# Patient Record
Sex: MALE | Race: ASIAN | NOT HISPANIC OR LATINO | Employment: FULL TIME
[De-identification: names, ages, dates, MRNs, and addresses within clinical notes are randomized per-mention and may not be internally consistent; named-entity substitution may affect disease eponyms.]

---
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
## 2023-12-10 ENCOUNTER — HOSPITAL ENCOUNTER (EMERGENCY)
Facility: HOSPITAL | Age: 23
Discharge: HOME | End: 2023-12-10
Admitting: PHYSICIAN ASSISTANT
Payer: COMMERCIAL

## 2023-12-10 VITALS
HEIGHT: 65 IN | SYSTOLIC BLOOD PRESSURE: 129 MMHG | TEMPERATURE: 98 F | OXYGEN SATURATION: 98 % | HEART RATE: 69 BPM | WEIGHT: 175 LBS | RESPIRATION RATE: 18 BRPM | DIASTOLIC BLOOD PRESSURE: 67 MMHG | BODY MASS INDEX: 29.16 KG/M2

## 2023-12-10 DIAGNOSIS — R11.2 NAUSEA AND VOMITING: ICD-10-CM

## 2023-12-10 DIAGNOSIS — H11.33 SUBCONJUNCTIVAL HEMORRHAGE, BILATERAL: ICD-10-CM

## 2023-12-10 PROCEDURE — 99282 EMERGENCY DEPT VISIT SF MDM: CPT

## 2023-12-10 NOTE — Clinical Note
Olvin Macias was seen and treated in our emergency department on 12/10/2023.  He may return to work on 12/14/2023.       If you have any questions or concerns, please don't hesitate to call.      Annika Williamson PA-C

## 2023-12-10 NOTE — ED PROVIDER NOTES
EMERGENCY DEPARTMENT ENCOUNTER      NAME: Olvin Macias  AGE: 23 year old male  YOB: 2000  MRN: [id number]  EVALUATION DATE & TIME: No admission date for patient encounter.    PCP: No primary care provider on file.    ED PROVIDER: Annika Williamson PA-C      Chief Complaint   Patient presents with    Eye Problem         FINAL IMPRESSION:  1. Subconjunctival hemorrhage, bilateral    2. Nausea and vomiting          ED COURSE & MEDICAL DECISION MAKING:    10:38 AM I introduced myself to patient, performed initial HPI and examination.       23 year old male with no pertinent PMH presents to the Emergency Department for evaluation of bilateral eye redness after vomiting and coughing last night. Reports spicy and acidic food last night, subsequently vomited x 1. Shortly after noticed redness/bleeding in both eyes. Reports mild irritation but otherwise no pain. No vision changes. Does not wear contacts or glasses. No other abnormal bleeding. Not on anticoagulation. Abdominal discomfort/nausea has resolved.    VSS. Clinically patient has bilateral subconjunctival hemorrhage. PERRL, EOM intact. Eyes otherwise unremarkable. Abdomen benign. Nothing to suggest foreign body or ocular trauma. No indication for fluorescein/slit/wood's lamp. No concern for abnormal bleeding.     Discussed diagnosis with patient and partner. Instructed on at home supportive management, follow up with PCP as needed and red flags/indications to return to the emergency department. All questions were answered to the best of my ability and patient is agreeable with plan.         Medical Decision Making    History:  Supplemental history from: Documented in chart, if applicable and Family Member/Significant Other  External Record(s) reviewed: Documented in chart, if applicable.    Work Up:  Chart documentation includes differential considered and any EKGs or imaging independently interpreted by provider.  In additional to work up documented, I  considered the following work up: Documented in chart, if applicable.    External consultation:  Discussion of management with another provider: Documented in chart, if applicable    Complicating factors:  Care impacted by chronic illness: N/A  Care affected by social determinants of health: N/A    Disposition considerations: Discharge. No recommendations on prescription strength medication(s). See documentation for any additional details.      MEDICATIONS GIVEN IN THE EMERGENCY:  Medications - No data to display    NEW PRESCRIPTIONS STARTED AT TODAY'S ER VISIT  Discharge Medication List as of 12/10/2023 11:19 AM             =================================================================    HPI    Patient information was obtained from: Patient    Use of : N/A        Olvin Macias is a 23 year old male with no pertinent PMH who presents to this ED for evaluation of bilateral eye redness/bleeding.    Patient reports eating large amount of spicy food and juice last night, high acidity and subsequently vomited x 1 with associated cough. No alcohol. Since then has noticed blood in bilateral eyes. No vision changes. Does report some discomfort, but otherwise no pain.     Not on blood thinning medicine. No nosebleeds, abnormal bruising/rashing, hematuria or blood in stools.     Stomach feels improved today, no persistent nausea. No abdominal pain. Does not take acid reducers.           REVIEW OF SYSTEMS   ROS negative unless otherwise stated in HPI     PAST MEDICAL HISTORY:  No past medical history on file.    PAST SURGICAL HISTORY:  No past surgical history on file.    CURRENT MEDICATIONS:    cetirizine-pseudoephedrine (ZYRTEC-D) 5-120 mg per tablet        ALLERGIES:  No Known Allergies    FAMILY HISTORY:  No family history on file.    SOCIAL HISTORY:   Social History     Socioeconomic History    Marital status: Single       VITALS:  /67   Pulse 69   Temp 98  F (36.7  C) (Oral)   Resp 18   Ht 1.651 m  "(5' 5\")   Wt 79.4 kg (175 lb)   SpO2 98%   BMI 29.12 kg/m      PHYSICAL EXAM    Constitutional: Well developed, Well nourished, NAD, GCS 15   HENT: Normocephalic, Atraumatic, Oropharynx clear.   Neck- Supple, Nontender. Normal ROM.   Eyes: Subconjunctival hemorrhage to bilateral eyes, more along lateral and inferior aspect of the sclera. No Chemosis. PERRL, EOM intact. No photophobia. Eyelids WNL.   Respiratory: No respiratory distress, speaking in full sentences. Normal breath sounds  Cardiovascular: Normal heart rate, Regular rhythm, No murmurs  GI: Soft, nontender  Musculoskeletal: No deformities, Moves all extremities equally.   Integument: Warm, Dry, No erythema, ecchymosis, or rash.  Neurologic: Alert & oriented x 3, Normal sensory function. No focal deficits.   Psychiatric: Affect normal, Judgment normal, Mood normal. Cooperative.      LAB:  All pertinent labs reviewed and interpreted.       RADIOLOGY:  Reviewed all pertinent imaging. Please see official radiology report.  No orders to display       EKG:    None    PROCEDURES:   None      Annika Williamson PA-C  Emergency Medicine  [facility] EMERGENCY DEPARTMENT  [facility] [facility] [id number] [phone]             Annika Williamson PA-C  12/10/23 5379    "

## 2023-12-10 NOTE — ED TRIAGE NOTES
"Pt ambulatory to triage c/o bleeding in both eyes after vomiting and coughing forcefully last night after eating and drinking \"too much\". Pt appears to have subconjunctival hemorrhage to both eyes.  Denies vision changes.         "

## 2023-12-10 NOTE — Clinical Note
Olvin Macias was seen and treated in our emergency department on 12/10/2023.  He may return to work on 12/13/2023.       If you have any questions or concerns, please don't hesitate to call.      Mabel Dorantes PA-C

## 2023-12-10 NOTE — Clinical Note
Olvin Macias was seen and treated in our emergency department on 12/10/2023.  He may return to work on 12/14/2023.       If you have any questions or concerns, please don't hesitate to call.      Mabel Dorantes PA-C

## 2023-12-10 NOTE — DISCHARGE INSTRUCTIONS
You can use tums or maalox as needed for stomach upset.  Eat a bland diet for the next few days.    You can use tylenol as needed for pain.    Follow up in clinic as needed for recheck.    Return to the emergency department if you develop fevers, eye pain, vision losses/changes, or any abnormal bleeding as we discussed. We would be happy to see you.

## 2023-12-11 ENCOUNTER — HOSPITAL ENCOUNTER (EMERGENCY)
Facility: HOSPITAL | Age: 23
Discharge: HOME | End: 2023-12-11
Payer: COMMERCIAL

## 2023-12-11 VITALS
SYSTOLIC BLOOD PRESSURE: 135 MMHG | BODY MASS INDEX: 29.16 KG/M2 | RESPIRATION RATE: 16 BRPM | TEMPERATURE: 97.9 F | HEART RATE: 60 BPM | HEIGHT: 65 IN | DIASTOLIC BLOOD PRESSURE: 77 MMHG | WEIGHT: 175 LBS | OXYGEN SATURATION: 100 %

## 2023-12-11 DIAGNOSIS — H11.33 SUBCONJUNCTIVAL HEMORRHAGE, BILATERAL: ICD-10-CM

## 2023-12-11 PROCEDURE — 99283 EMERGENCY DEPT VISIT LOW MDM: CPT

## 2023-12-11 ASSESSMENT — ENCOUNTER SYMPTOMS
DIARRHEA: 0
FEVER: 0
PHOTOPHOBIA: 0
EYE PAIN: 0
VOMITING: 0
CHILLS: 0
RHINORRHEA: 0
EYE DISCHARGE: 0
LIGHT-HEADEDNESS: 0
SINUS PAIN: 0
NAUSEA: 0
ABDOMINAL PAIN: 0
DIZZINESS: 0
EYE ITCHING: 0
HEADACHES: 0
SHORTNESS OF BREATH: 0

## 2023-12-11 ASSESSMENT — ACTIVITIES OF DAILY LIVING (ADL): ADLS_ACUITY_SCORE: 33

## 2023-12-11 ASSESSMENT — VISUAL ACUITY: OU: 1

## 2023-12-11 NOTE — ED PROVIDER NOTES
EMERGENCY DEPARTMENT ENCOUNTER      NAME: Olvin Macias  AGE: 23 year old male  YOB: 2000  MRN: [id number]  EVALUATION DATE & TIME: No admission date for patient encounter.    PCP: System, Provider Not In    ED PROVIDER: Niharika Hernandez PA-C      Chief Complaint   Patient presents with    Letter for School/Work       FINAL IMPRESSION:  1. Subconjunctival hemorrhage, bilateral      ED COURSE & MEDICAL DECISION MAKING:    Pertinent Labs & Imaging studies reviewed. (See chart for details)  23 year old male presents to the Emergency Department for evaluation of work note.  Yesterday patient was evaluated for nausea and vomiting and subconjunctival viral hemorrhage bilaterally.  At that time patient had vomited once and shortly after noticed redness/bleeding in both of his eyes. Patient was discharged home with bilateral conjunctival hemorrhage.  Patient presents today with no symptoms.  Patient reports that he needs a work note. Patient denies any nausea, vomiting, dizziness, lightheadedness, vision changes, eye pain, worsening eye redness/bleeding, neck pain, chest pain, shortness of breath, fevers, chills. Vital signs were unremarkable.  Afebrile.  On exam, cardiac and pulmonary exams unremarkable.  No facial bone tenderness.  Extraocular movements are intact.  Pupils are equal round and reactive.  Subconjunctival hemorrhage to bilateral eyes along the lateral and inferior aspect of the sclera.  No photophobia.  No swelling of the eyelids.  Abdomen is soft and nontender. No signs of infection at this time.     Reports that his symptoms have completely resolved.  Patient was provided a work note for the remainder of the week.  Patient was referred to [facility] for follow-up.  Patient will follow-up with his primary care doctor to discuss his ED visit.  Patient will return to the ED if new symptoms develop or symptoms worsen.  Strict return precautions were provided to the patient.  Patient agrees with  plan.  All questions answered.      ED COURSE:   4:28 PM  I saw the patient.  We discharged home.  Patient agrees with plan.  All questions answered.       At the conclusion of the encounter I discussed the results of all of the tests and the disposition. The questions were answered. The patient or family acknowledged understanding and was agreeable with the care plan.     0 minutes of critical care time       Additional Documentation    History:  Supplemental history from: Documented in chart, if applicable  External Record(s) reviewed: Documented in chart, if applicable.    Work Up:  Chart documentation includes differential considered and any EKGs or imaging interpreted by provider.  In additional to work up documented, I considered the following work up: Documented in chart, if applicable.    External consultation:  Discussion of management with another provider: Documented in chart, if applicable    Complicating factors:  Care impacted by chronic illness: N/A  Care affected by social determinants of health: N/A    Disposition considerations: Discharge. No recommendations on prescription strength medication(s). See documentation for any additional details.      MEDICATIONS GIVEN IN THE EMERGENCY:  Medications - No data to display    NEW PRESCRIPTIONS STARTED AT TODAY'S ER VISIT  New Prescriptions    No medications on file     =================================================================    HPI    Patient information was obtained from: patient    Use of : N/A       Olvin Macias is a 23 year old male who presents to this ED for evaluation of work note.    Per chart review, patient was seen in the emergency room yesterday for bilateral subconjunctival hemorrhage.  Patient reports that he had 1 episode of vomiting and nausea and then noticed blood in bilateral eyes.  No vision changes.  He is not on any blood thinning medications.  Patient was discharged home in a stable condition.  Patient returns to  "the ED today for a work note.  Patient reports that he continues to not have any vision changes.  No pain with movement of the eyes.  No nausea or vomiting.  No fevers or chills.  Patient reports that his symptoms have completely resolved and is only presenting to the ED for a work note.    He denies chest pain, shortness of breath, cough, congestion, runny nose, vision changes, photophobia, eye pain, headache, dizziness, lightheadedness, abdominal pain, nausea, vomiting, diarrhea, dizziness, lightheadedness, headache and all other symptoms.      REVIEW OF SYSTEMS   Review of Systems   Constitutional:  Negative for chills and fever.   HENT:  Negative for congestion, rhinorrhea and sinus pain.    Eyes:  Negative for photophobia, pain, discharge, itching and visual disturbance.   Respiratory:  Negative for shortness of breath.    Cardiovascular:  Negative for chest pain.   Gastrointestinal:  Negative for abdominal pain, diarrhea, nausea and vomiting.   Genitourinary: Negative.    Neurological:  Negative for dizziness, light-headedness and headaches.   All other systems reviewed and are negative.      PAST MEDICAL HISTORY:  No past medical history on file.    PAST SURGICAL HISTORY:  No past surgical history on file.        CURRENT MEDICATIONS:    cetirizine-pseudoephedrine (ZYRTEC-D) 5-120 mg per tablet        ALLERGIES:  No Known Allergies    FAMILY HISTORY:  No family history on file.    SOCIAL HISTORY:   Social History     Socioeconomic History    Marital status: Single       VITALS:  /74   Pulse 70   Temp 97.9  F (36.6  C) (Temporal)   Resp 16   Ht 1.651 m (5' 5\")   Wt 79.4 kg (175 lb)   SpO2 98%   BMI 29.12 kg/m      PHYSICAL EXAM    Physical Exam  Vitals and nursing note reviewed.   Constitutional:       Appearance: Normal appearance.   HENT:      Head: Atraumatic.      Jaw: There is normal jaw occlusion.      Right Ear: Hearing, tympanic membrane, ear canal and external ear normal.      Left Ear: " Hearing, tympanic membrane, ear canal and external ear normal.      Nose: Nose normal.      Right Sinus: No maxillary sinus tenderness or frontal sinus tenderness.      Left Sinus: No maxillary sinus tenderness or frontal sinus tenderness.      Mouth/Throat:      Mouth: Mucous membranes are moist.      Tongue: No lesions. Tongue does not deviate from midline.      Palate: No mass and lesions.      Pharynx: Oropharynx is clear. Uvula midline. No pharyngeal swelling, oropharyngeal exudate, posterior oropharyngeal erythema or uvula swelling.      Tonsils: No tonsillar exudate or tonsillar abscesses.   Eyes:      General: Lids are normal. Vision grossly intact.         Right eye: No foreign body, discharge or hordeolum.         Left eye: No foreign body, discharge or hordeolum.      Extraocular Movements: Extraocular movements intact.      Right eye: Normal extraocular motion and no nystagmus.      Left eye: Normal extraocular motion and no nystagmus.      Conjunctiva/sclera:      Right eye: Right conjunctiva is not injected. Hemorrhage present. No chemosis or exudate.     Left eye: Left conjunctiva is not injected. Hemorrhage present. No chemosis or exudate.     Pupils: Pupils are equal, round, and reactive to light.   Cardiovascular:      Rate and Rhythm: Normal rate and regular rhythm.      Pulses: Normal pulses.      Heart sounds: Normal heart sounds. No murmur heard.     No friction rub. No gallop.   Pulmonary:      Effort: Pulmonary effort is normal.      Breath sounds: Normal breath sounds. No wheezing or rales.   Abdominal:      Tenderness: There is no abdominal tenderness. There is no guarding or rebound.   Musculoskeletal:      Cervical back: Normal range of motion.   Skin:     General: Skin is dry.   Neurological:      Mental Status: He is alert. Mental status is at baseline.   Psychiatric:         Mood and Affect: Mood normal.         Thought Content: Thought content normal.          LAB:  All pertinent labs  reviewed and interpreted.  Labs Ordered and Resulted from Time of ED Arrival to Time of ED Departure - No data to display     RADIOLOGY:  Reviewed all pertinent imaging. Please see official radiology report.  No orders to display       Niharika Hernandez PA-C  [facility] EMERGENCY DEPARTMENT  [address] [place] [id number]  [phone]     Niharika Hernandez PA-C  12/11/23 4945

## 2023-12-11 NOTE — ED TRIAGE NOTES
Triage Assessment (Adult)       Row Name 12/11/23 1501          Respiratory WDL    Respiratory WDL WDL        Skin Circulation/Temperature WDL    Skin Circulation/Temperature WDL WDL        Cardiac WDL    Cardiac WDL WDL        Peripheral/Neurovascular WDL    Peripheral Neurovascular WDL WDL        Cognitive/Neuro/Behavioral WDL    Cognitive/Neuro/Behavioral WDL WDL                   Patient was seen yesterday- would like a note to be off work.

## 2023-12-11 NOTE — Clinical Note
Olvin Macias was seen and treated in our emergency department on 12/11/2023.  He may return to work on 12/16/2023.       If you have any questions or concerns, please don't hesitate to call.      Niharika Hernandez, AREN

## 2023-12-11 NOTE — DISCHARGE INSTRUCTIONS
Rest.  Orally hydrate.  Follow-up with [facility] to discuss your ED visit.  Return to the ED if new symptoms develop or symptoms worsen.  I have also referred you to a primary care doctor to establish care.

## 2023-12-18 ENCOUNTER — HOSPITAL ENCOUNTER (EMERGENCY)
Facility: HOSPITAL | Age: 23
Discharge: HOME | End: 2023-12-18
Payer: COMMERCIAL

## 2023-12-18 VITALS
HEART RATE: 70 BPM | TEMPERATURE: 97.8 F | HEIGHT: 65 IN | DIASTOLIC BLOOD PRESSURE: 86 MMHG | RESPIRATION RATE: 16 BRPM | WEIGHT: 175 LBS | SYSTOLIC BLOOD PRESSURE: 148 MMHG | OXYGEN SATURATION: 97 % | BODY MASS INDEX: 29.16 KG/M2

## 2023-12-18 DIAGNOSIS — H11.33 SUBCONJUNCTIVAL HEMORRHAGE OF BOTH EYES: ICD-10-CM

## 2023-12-18 PROCEDURE — 99282 EMERGENCY DEPT VISIT SF MDM: CPT

## 2023-12-18 NOTE — ED PROVIDER NOTES
EMERGENCY DEPARTMENT ENCOUNTER      NAME: Aba Macias  AGE: 23 year old male  YOB: 2000  MRN: [id number]  EVALUATION DATE & TIME: 12/18/23 11:48 AM    PCP: aJkob Garcia (Inactive)    ED PROVIDER: Amy Ford PA-C      CHIEF COMPLAINT:  Eye Problem      FINAL IMPRESSION:  1. Subconjunctival hemorrhage of both eyes          ED COURSE & MEDICAL DECISION MAKING:  Pertinent Labs & Imaging studies reviewed. (See chart for details)    MDM: The patient is a 23 year old male with history of recent bilateral subconjunctival hemorrhage who presents to the Emergency Department for evaluation of bilateral eye problem and work note. The patient had a coughing fit on 12/10 and subsequently developed bilateral subconjunctival hemorrhage. He has been seen for this in the emergency department twice previously. He presents today for a work note as he lifts heavy objects at work. He has follow up with eye doctor scheduled for 12/22/23.    Initial vitals reviewed and significant for elevated blood pressure, otherwise within normal limits. Repeat vital signs with improved yet elevated blood pressure. On exam, the patient is clinically well appearing resting comfortably in exam chair in no acute distress. Conjunctiva with subconjunctival hemorrhages bilaterally, no drainage or leakage of fluid. No crusting. PERRL, EOMI and painless. No periorbital skin changes or edema or erythema. No hypopyon or hyphema.  Visual fields intact to confrontation. He is able to read small and large font on paper 8-12 inches away from his face.    Differential diagnosis includes bilateral subconjunctival hemorrhage.  Low clinical suspicion for conjunctivitis, corneal abrasion or ulceration, cellulitis, globe rupture, retinal detachment acute angle closure glaucoma, septal cellulitis.     This is the patient's first episode of subconjunctival hemorrhage and in the absence of other significant bruising or bleeding easily, I believe it  is reasonable to defer laboratory studies at this time. Symptoms and work up most consistent with subconjunctival hemorrhage.     Plan for discharge to home in stable condition with work note and close outpatient follow up with [facility] as planned as well as referral placed to primary care to follow up on blood pressure. The patient verbalized understanding and is in agreement with this plan. Emphasized importance of close follow up with primary care clinician. Strict return precautions discussed.        Medical Decision Making    History:  Supplemental history from: Documented in chart, if applicable  External Record(s) reviewed: Documented in chart, if applicable.    Work Up:  Chart documentation includes differential considered and any EKGs or imaging independently interpreted by provider, where specified.  In additional to work up documented, I considered the following work up: Documented in chart, if applicable.    External consultation:  Discussion of management with another provider: Documented in chart, if applicable    Complicating factors:  Care impacted by chronic illness: N/A  Care affected by social determinants of health: N/A    Disposition considerations: Discharge. No recommendations on prescription strength medication(s). N/A.        ED COURSE:       11:56 AM I met and introduced myself to the patient. I gathered initial history and performed an initial physical exam. We discussed options and plan for diagnostics and treatment here in the ED. Plan for discharge to home with work note.     At the conclusion of the encounter I discussed the results of all the tests and the disposition. The questions were answered to the best of my ability. The patient and/or family acknowledged understanding and was agreeable with the care plan.          MEDICATIONS GIVEN IN THE EMERGENCY:  Medications - No data to display    NEW PRESCRIPTIONS STARTED AT TODAY'S ER VISIT  New Prescriptions    No medications on file       "    =================================================================    HPI    Patient information was obtained from: the patient     Use of Intrepreter: N/A        Aba Macias is a 23 year old male with pertinent medical history of subconjunctival hemorrhage who presents to the emergency department for evaluation of eye problem.    The patient had a coughing fit on 12/10 and subsequently developed bilateral subconjunctival hemorrhage which he was seen in the emergency department for. He lifts heavy objects at work and received a work note to refrain from lifting heavy objects until he followed up with an eye doctor. He has an appointment scheduled for 12/22 with [facility] and needs a work note for today until then. No progressive erythema, no blurred or double vision, no eye pain, drainage, itching, headache, foreign body sensation. No other trauma or injury to eye. He wears glasses, not contact lewnses. This is his first episode, no other bruising or bleeding easily. He is not on blood thinning medication. No additional complaints at this time.        PAST MEDICAL HISTORY:  History reviewed. No pertinent past medical history.    PAST SURGICAL HISTORY:  History reviewed. No pertinent surgical history.    CURRENT MEDICATIONS:    Cannot display prior to admission medications because the patient has not been admitted in this contact.       ALLERGIES:  Allergies   Allergen Reactions    Nka [No Known Allergies]        FAMILY HISTORY:  History reviewed. No pertinent family history.    SOCIAL HISTORY:  Social History     Tobacco Use    Smoking status: Never        VITALS:    First Vitals:  Patient Vitals for the past 24 hrs:   BP Temp Temp src Pulse Resp SpO2 Height Weight   12/18/23 1144 (!) 157/84 97.8  F (36.6  C) Oral 71 18 99 % 1.651 m (5' 5\") 79.4 kg (175 lb)       Patient Vitals for the past 24 hrs:   BP Temp Temp src Pulse Resp SpO2 Height Weight   12/18/23 1144 (!) 157/84 97.8  F (36.6  C) Oral 71 18 99 % 1.651 m " "(5' 5\") 79.4 kg (175 lb)       PHYSICAL EXAM  VITAL SIGNS: BP (!) 157/84   Pulse 71   Temp 97.8  F (36.6  C) (Oral)   Resp 18   Ht 1.651 m (5' 5\")   Wt 79.4 kg (175 lb)   SpO2 99%   BMI 29.12 kg/m     GENERAL: Awake, alert, answering questions appropriately, well-appearing.  HEENT: Conjunctiva with subconjunctival hemorrhages bilaterally, no drainage or leakage of fluid. No crusting. PERRL, EOMI and painless. No periorbital skin changes or edema or erythema. No hypopyon or hyphema.  Visual fields intact to confrontation. He is able to read small and large font on paper 8-12 inches away from his face.  SPEECH:  Easy to understand speech, Normal volume and vidhya.  PULMONARY: No respiratory distress, Breathing comfortably on room air. Lungs clear to auscultation bilaterally.  CARDIOVASCULAR: Regular rate and rhythm, radial pulses present, symmetric, and normal.  EXTREMITIES: Extremities are warm and well perfused.   NEUROLOGIC: Moving all extremities spontaneously.   SKIN: Exposed areas of skin warm, dry, no rashes.  PSYCH: Normal mood and affect.          RADIOLOGY/LAB:  Reviewed all pertinent imaging. Please see official radiology report.  All pertinent labs reviewed and interpreted.                     Amy Ford PA-C  Emergency Medicine  [facility] EMERGENCY DEPARTMENT  [facility] [facility] [address]  [phone]  Dept: [phone]     Amy Ford PA-C  12/20/23 2148    "

## 2023-12-18 NOTE — ED TRIAGE NOTES
Patient arrives with bilateral eye redness for the past week. Denies pain, drainage, itching, vision changes, HA or other symptoms. Was here 12/10. Needs work note.     Triage Assessment (Adult)       Row Name 12/18/23 1145          Triage Assessment    Airway WDL WDL        Respiratory WDL    Respiratory WDL WDL        Cognitive/Neuro/Behavioral WDL    Cognitive/Neuro/Behavioral WDL WDL

## 2023-12-18 NOTE — Clinical Note
Aba Macias was seen and treated in our emergency department on 12/18/2023.  He may return to work on 12/24/2023.       If you have any questions or concerns, please don't hesitate to call.      Amy Ford PA-C

## 2023-12-18 NOTE — DISCHARGE INSTRUCTIONS
You were seen in the emergency department for subconjunctival hemorrhage and work note. A work note was provided. Please follow up with [facility] on Friday as planned. Referral placed to reestablish with primary care. Return to the emergency department if you develop any new or worsening symptoms including fevers, vision changes, eye pain, redness or swelling around the eye, bruising easily.

## 2023-12-22 ENCOUNTER — OFFICE VISIT (OUTPATIENT)
Dept: FAMILY MEDICINE | Facility: CLINIC | Age: 23
End: 2023-12-22
Payer: COMMERCIAL

## 2023-12-22 VITALS
TEMPERATURE: 97.9 F | RESPIRATION RATE: 16 BRPM | SYSTOLIC BLOOD PRESSURE: 114 MMHG | OXYGEN SATURATION: 98 % | HEART RATE: 62 BPM | DIASTOLIC BLOOD PRESSURE: 45 MMHG | WEIGHT: 175.2 LBS | HEIGHT: 65 IN | BODY MASS INDEX: 29.19 KG/M2

## 2023-12-22 DIAGNOSIS — Z23 ENCOUNTER FOR IMMUNIZATION: ICD-10-CM

## 2023-12-22 DIAGNOSIS — H11.33 SUBCONJUNCTIVAL HEMORRHAGE, BILATERAL: Primary | ICD-10-CM

## 2023-12-22 PROCEDURE — 90686 IIV4 VACC NO PRSV 0.5 ML IM: CPT | Performed by: PHYSICIAN ASSISTANT

## 2023-12-22 PROCEDURE — 99203 OFFICE O/P NEW LOW 30 MIN: CPT | Mod: 25 | Performed by: PHYSICIAN ASSISTANT

## 2023-12-22 PROCEDURE — 90471 IMMUNIZATION ADMIN: CPT | Performed by: PHYSICIAN ASSISTANT

## 2023-12-22 PROCEDURE — 91320 SARSCV2 VAC 30MCG TRS-SUC IM: CPT | Performed by: PHYSICIAN ASSISTANT

## 2023-12-22 PROCEDURE — 90480 ADMN SARSCOV2 VAC 1/ONLY CMP: CPT | Performed by: PHYSICIAN ASSISTANT

## 2023-12-22 NOTE — COMMUNITY RESOURCES LIST (ENGLISH)
12/22/2023   [facility] - Outpatient Clinics  N/A  For additional resource needs, please contact your health insurance member services or your primary care team.  Phone: [phone]   Email: N/A   Address: [facility] [place] [address]   Hours: N/A        Hotlines and Helplines       Hotline - Housing crisis  1  [facility] Distance: 9.48 miles      Phone/Virtual   [address] [place] [address]  Language: English  Hours: Mon - Sun Open 24 Hours   Phone: (730) 651-7256 Email: communications@[facility].org Website: https://[facility]-[place].org/oursaviourshousing/     2  [facility] Distance: 11.03 miles      Phone/Virtual   [address] [place] [address]  Language: English  Hours: Mon - Sun Open 24 Hours   Phone: (209) 502-3256 Email: info@[facility].org Website: http://www.[facility].org          Housing       Coordinated Entry access point  3  [facility] Distance: 4.35 miles      In-Person, Phone/Virtual   [address] [place] [address]  Language: English  Hours: Mon - Fri 8:30 AM - 4:30 PM  Fees: Free   Phone: (822) 338-2171 Email: info@[facility].org Website: https://www.[facility].org/locations/[facility]/     4  [facility] - Coordinated Access to Housing and Shelter (CAHS) - Coordinated Access - Coordinated Entry access point Distance: 5.13 miles      In-Person, Phone/Virtual   [address] [place] [address]  Language: English  Hours: Mon - Fri 8:00 AM - 4:30 PM  Fees: Free   Phone: (115) 160-6129 Website: https://www.[facility]./residents/assistance-support/assistance/housing-services-support     Drop-in center or day shelter  5  [facility] Distance: 3.4 miles      In-Person   [address] [place] [address]  Language: English  Hours: Mon - Fri 9:00 AM - 4:00 PM  Fees: Free   Phone: (191) 095-8268 Email: frontpatsyk@listeninghouse.org Website:  http://listeninghouse.org     6  [facility] Distance: 9.37 miles      In-Person   [address] [place] [address]  Language: English, Wallisian, South Sudanese  Hours: Mon - Sat 7:00 AM - 3:00 PM  Fees: Free, Self Pay   Phone: (961) 372-4428 Email: info@[employer] Website: https://www.[employer]/locations/opportunity-center/     Housing search assistance  7  [employer] - https://[employer]/ Distance: 9.28 miles      Phone/Virtual   [address] [place] [address]  Language: English  Hours: Mon - Sun Open 24 Hours   Email: info@[employer] Website: https://[employer]     8  [employer] - Online housing search assistance Distance: 10.36 miles      Phone/Virtual   [address] [address]  Language: English, Hmong, Wallisian, South Sudanese  Hours: Mon - Sun Open 24 Hours   Phone: (471) 719-2989 Email: info@[employer] Website: http://www.PickParklink.org/     Shelter for families  9  [facility] Distance: 11.17 miles      In-Person   [address] [place] [address]  Language: English  Hours: Mon - Fri 3:00 PM - 9:00 AM , Sat - Sun Open 24 Hours  Fees: Free   Phone: (982) 127-2008 Ext.1 Website: https://www.[facility].org/2020/07/03/emergency-family-shelter/     Shelter for individuals  10  [facility] [place] [facility] [place] [facility] Distance: 4.35 miles      In-Person   [address] [place] [address]  Language: English  Hours: Mon - Sun 5:00 PM - 10:00 AM  Fees: Free, Self Pay   Phone: (688) 890-4529 Email: info@[employer] Website: https://www.[employer]/locations/[facility]/     11  [facility] - Coordinated Access to Housing and Shelter (CAHS) - Coordinated Access - Emergency housing Distance: 5.13 miles       In-Person, Phone/Virtual   [address] [place] [address]  Language: English  Hours: Mon - Fri 8:00 AM - 4:30 PM  Fees: Free   Phone: (659) 616-4180 Website: https://www.[employer]./residents/assistance-support/assistance/housing-services-support          Important Numbers & Websites       [address]  Poison Control   (237) 606-8109 [place]  Suicide and Crisis Lifeline   [address] [place], [place] [address]line.org  Childhelp [place] Child Abuse Hotline   544.365.3150 Childhelphotline.org  [place] Sexual Assault Hotline   (192) 900-7678 (HOPE) [facility] Runaway Safeline   (589) 269-7433 (RUNAWAY) [facility] and Clinics[facility].org  Pregnancy & Postpartum Support [place]   Call/text 853-698-5722 Ppsupport[place].org  Substance Abuse National Helpline ([facility]   486-327-HELP (1307) Findtreatment.gov  Emergency Services   911

## 2023-12-22 NOTE — PROGRESS NOTES
"  Assessment & Plan     Subconjunctival hemorrhage, bilateral  Ongoing issue, improving.  Discussed no vision changes and no pain, avoid aspirin products.  Follow up if symptoms worsen or do not improve.  - Primary Care Referral    Encounter for immunization  - COVID-19 12+ (2023-24) (PFIZER)           MED REC REQUIRED  Post Medication Reconciliation Status: patient was not discharged from an inpatient facility or TCU  BMI:   Estimated body mass index is 29.15 kg/m  as calculated from the following:    Height as of this encounter: 1.651 m (5' 5\").    Weight as of this encounter: 79.5 kg (175 lb 3.2 oz).   Weight management plan: Discussed healthy diet and exercise guidelines    Risks, benefits and alternatives were discussed with patient. Agreeable to the plan of care.      Laurie Rush PA-C  [facility]   Olvin is a 23 year old, presenting for the following health issues:  ER F/U        12/22/2023    10:03 AM   Additional Questions   Roomed by Cordelia BURROUGHS LPN       [facility]     ED/UC Followup:    Facility:  [facility]  Date of visit: 12/10/23  Reason for visit: Subconjunctival hemorrhage, bilateral, Nausea and vomiting  Current Status:     Patient is here today to follow up on his eyes  He was seen in the ER 3x times for bilateral eye redness  Notes the redness is improving  No vision change, no pain  His blood pressure in the ER was high, but has improved here today.      Review of Systems   Constitutional, HEENT, cardiovascular, pulmonary, gi and gu systems are negative, except as otherwise noted.      Objective    /45   Pulse 62   Temp 97.9  F (36.6  C) (Oral)   Resp 16   Ht 1.651 m (5' 5\")   Wt 79.5 kg (175 lb 3.2 oz)   SpO2 98%   BMI 29.15 kg/m    Body mass index is 29.15 kg/m .  Physical Exam   GENERAL: healthy, alert and no distress  EYES: bilateral subconjunctival hemorrhage, PERRLA  NECK: no adenopathy, no asymmetry, masses, or scars and thyroid " normal to palpation  RESP: lungs clear to auscultation - no rales, rhonchi or wheezes  CV: regular rate and rhythm, normal S1 S2, no S3 or S4, no murmur, click or rub, no peripheral edema and peripheral pulses strong  MS: no gross musculoskeletal defects noted, no edema

## 2023-12-22 NOTE — COMMUNITY RESOURCES LIST (ENGLISH)
12/22/2023   [facility]  N/A  For questions about this resource list or additional care needs, please contact your primary care clinic or care manager.  Phone: [phone]   Email: N/A   Address: [address] [place] [address]   Hours: N/A        Hotlines and Helplines       Hotline - Housing crisis  1  [facility] Distance: 9.48 miles      Phone/Virtual   [address] [place] [address]  Language: English  Hours: Mon - Sun Open 24 Hours   Phone: (112) 680-2390 Email: communications@[facility].org Website: https://[facility].org/oursaviourshousing/     2  [facility] Distance: 11.03 miles      Phone/Virtual   [address] [place] [address]  Language: English  Hours: Mon - Sun Open 24 Hours   Phone: (188) 485-1331 Email: info@[facility].travelmob Website: http://www.[facility].org          Housing       Coordinated Entry access point  3  [facility] Distance: 4.35 miles      In-Person, Phone/Virtual   [address] [place] [address]  Language: English  Hours: Mon - Fri 8:30 AM - 4:30 PM  Fees: Free   Phone: (394) 593-4194 Email: info@[facility].org Website: https://www.[facility].org/locations/[facility]/     4  [facility] - Coordinated Access to Housing and Shelter (CAHS) - Coordinated Access - Coordinated Entry access point Distance: 5.13 miles      In-Person, Phone/Virtual   [address] [place] [address]  Language: English  Hours: Mon - Fri 8:00 AM - 4:30 PM  Fees: Free   Phone: (655) 271-1296 Website: https://www.[facility]./residents/assistance-support/assistance/housing-services-support     Drop-in center or day shelter  5  [facility] Distance: 3.4 miles      In-Person   [address] [place] [address]  Language: English  Hours: Mon - Fri 9:00 AM - 4:00 PM  Fees: Free   Phone: (944) 697-7423 Email: anthony@[facility].org Website:  http://[facility] CenterRollUp Media.org     6  [facility] [place] [facility] Distance: 9.37 miles      In-Person   [address] [place] [address]  Language: English, Bhutanese, Papua [place]inean  Hours: Mon - Sat 7:00 AM - 3:00 PM  Fees: Free, Self Pay   Phone: (975) 735-4052 Email: info@[employer] Website: https://www.[employer]/locations/opportunity-center/     Housing search assistance  7  [facility] - Housing Search Assistance Distance: 5.34 miles      Phone/Virtual   [address] [place] [address]  Language: Danish, English, Hmong, Krista, Bhutanese, [place]inean  Hours: Mon - Fri Appt. Only  Fees: Free   Phone: (865) 602-2481 Website: https://[employer]/     8  [facility] - Mental Health Crisis Housing Search Assistance Distance: 6.02 miles      In-Person, Phone/Virtual   [address] [facility] [facility] [address] [place] [address]  Language: English  Hours: Mon - Tue 8:00 AM - 4:30 PM , Wed 8:00 AM - 6:00 PM , Thu - Fri 8:00 AM - 4:30 PM  Fees: Free   Phone: (150) 720-8172 Email: [facility]. Website: https://www.[facility]./residents/health-medical/clinics-services/mental-health/adult-mental-health     Shelter for families  9  [facility] Distance: 11.17 miles      In-Person   [address] [place] [address]  Language: English  Hours: Mon - Fri 3:00 PM - 9:00 AM , Sat - Sun Open 24 Hours  Fees: Free   Phone: (954) 164-8568 Ext.1 Website: https://www.[facility].org/2020/07/03/emergency-family-shelter/     Shelter for individuals  10  [facility] [place] [facility] [place] [facility] - [facility] [place] [facility] Distance: 4.35 miles      In-Person   [address] [place] [address]  Language: English  Hours: Mon - Sun 5:00 PM - 10:00 AM  Fees: Free, Self Pay   Phone: (212) 262-3749 Email: info@[employer].org Website:  https://www.[facility].org/locations/[facility]/     11  [facility] - Coordinated Access to Housing and Shelter (CAHS) - Coordinated Access - Emergency housing Distance: 5.13 miles      In-Person, Phone/Virtual   [address] [place] [address]  Language: English  Hours: Mon - Fri 8:00 AM - 4:30 PM  Fees: Free   Phone: (440) 371-8424 Website: https://www.[facility]./residents/assistance-support/assistance/housing-services-support          Important Numbers & Websites       Emergency Services   911  [facility]   311  Poison Control   (449) 947-8421  Suicide Prevention Lifeline   (480) 963-1546 (TALK)  Child Abuse Hotline   (139) 410-3406 (4-A-Child)  Sexual Assault Hotline   (105) 728-2482 (HOPE)  National Runaway Safeline   (838) 570-6321 (RUNAWAY)  All-Options Talkline   (760) 813-5193  Substance Abuse Referral   (153) 737-7726 (HELP)

## 2024-01-11 ENCOUNTER — HOSPITAL ENCOUNTER (EMERGENCY)
Facility: HOSPITAL | Age: 24
Discharge: HOME | End: 2024-01-11
Admitting: EMERGENCY MEDICINE
Payer: COMMERCIAL

## 2024-01-11 VITALS
OXYGEN SATURATION: 99 % | HEART RATE: 111 BPM | WEIGHT: 175 LBS | BODY MASS INDEX: 29.16 KG/M2 | RESPIRATION RATE: 18 BRPM | HEIGHT: 65 IN | TEMPERATURE: 100.7 F | SYSTOLIC BLOOD PRESSURE: 121 MMHG | DIASTOLIC BLOOD PRESSURE: 63 MMHG

## 2024-01-11 DIAGNOSIS — R42 LIGHT HEADEDNESS: ICD-10-CM

## 2024-01-11 LAB
ALBUMIN SERPL BCG-MCNC: 4.7 G/DL (ref 3.5–5.2)
ALP SERPL-CCNC: 85 U/L (ref 40–150)
ALT SERPL W P-5'-P-CCNC: 41 U/L (ref 0–70)
ANION GAP SERPL CALCULATED.3IONS-SCNC: 12 MMOL/L (ref 7–15)
AST SERPL W P-5'-P-CCNC: 23 U/L (ref 0–45)
BASOPHILS # BLD AUTO: 0 10E3/UL (ref 0–0.2)
BASOPHILS NFR BLD AUTO: 0 %
BILIRUB SERPL-MCNC: 0.6 MG/DL
BUN SERPL-MCNC: 21 MG/DL (ref 6–20)
CALCIUM SERPL-MCNC: 9.4 MG/DL (ref 8.6–10)
CHLORIDE SERPL-SCNC: 99 MMOL/L (ref 98–107)
CREAT SERPL-MCNC: 1.04 MG/DL (ref 0.67–1.17)
D DIMER PPP FEU-MCNC: <0.27 UG/ML FEU (ref 0–0.5)
DEPRECATED HCO3 PLAS-SCNC: 26 MMOL/L (ref 22–29)
EGFRCR SERPLBLD CKD-EPI 2021: >90 ML/MIN/1.73M2
EOSINOPHIL # BLD AUTO: 0.1 10E3/UL (ref 0–0.7)
EOSINOPHIL NFR BLD AUTO: 1 %
ERYTHROCYTE [DISTWIDTH] IN BLOOD BY AUTOMATED COUNT: 11.2 % (ref 10–15)
FLUAV RNA SPEC QL NAA+PROBE: NEGATIVE
FLUBV RNA RESP QL NAA+PROBE: NEGATIVE
GLUCOSE SERPL-MCNC: 102 MG/DL (ref 70–99)
HCT VFR BLD AUTO: 52.3 % (ref 40–53)
HGB BLD-MCNC: 17.9 G/DL (ref 13.3–17.7)
IMM GRANULOCYTES # BLD: 0 10E3/UL
IMM GRANULOCYTES NFR BLD: 0 %
LYMPHOCYTES # BLD AUTO: 0.4 10E3/UL (ref 0.8–5.3)
LYMPHOCYTES NFR BLD AUTO: 3 %
MAGNESIUM SERPL-MCNC: 1.9 MG/DL (ref 1.7–2.3)
MCH RBC QN AUTO: 28.6 PG (ref 26.5–33)
MCHC RBC AUTO-ENTMCNC: 34.2 G/DL (ref 31.5–36.5)
MCV RBC AUTO: 84 FL (ref 78–100)
MONOCYTES # BLD AUTO: 0.5 10E3/UL (ref 0–1.3)
MONOCYTES NFR BLD AUTO: 4 %
NEUTROPHILS # BLD AUTO: 11.9 10E3/UL (ref 1.6–8.3)
NEUTROPHILS NFR BLD AUTO: 92 %
NRBC # BLD AUTO: 0 10E3/UL
NRBC BLD AUTO-RTO: 0 /100
PLATELET # BLD AUTO: 230 10E3/UL (ref 150–450)
POTASSIUM SERPL-SCNC: 4 MMOL/L (ref 3.4–5.3)
PROT SERPL-MCNC: 8 G/DL (ref 6.4–8.3)
RBC # BLD AUTO: 6.26 10E6/UL (ref 4.4–5.9)
RSV RNA SPEC NAA+PROBE: NEGATIVE
SARS-COV-2 RNA RESP QL NAA+PROBE: NEGATIVE
SODIUM SERPL-SCNC: 137 MMOL/L (ref 135–145)
TROPONIN T SERPL HS-MCNC: <6 NG/L
TSH SERPL DL<=0.005 MIU/L-ACNC: 1.34 UIU/ML (ref 0.3–4.2)
WBC # BLD AUTO: 12.9 10E3/UL (ref 4–11)

## 2024-01-11 PROCEDURE — 85379 FIBRIN DEGRADATION QUANT: CPT | Performed by: EMERGENCY MEDICINE

## 2024-01-11 PROCEDURE — 258N000003 HC RX IP 258 OP 636: Performed by: EMERGENCY MEDICINE

## 2024-01-11 PROCEDURE — 83735 ASSAY OF MAGNESIUM: CPT | Performed by: EMERGENCY MEDICINE

## 2024-01-11 PROCEDURE — 84484 ASSAY OF TROPONIN QUANT: CPT | Performed by: EMERGENCY MEDICINE

## 2024-01-11 PROCEDURE — 80053 COMPREHEN METABOLIC PANEL: CPT | Performed by: EMERGENCY MEDICINE

## 2024-01-11 PROCEDURE — 84443 ASSAY THYROID STIM HORMONE: CPT | Performed by: EMERGENCY MEDICINE

## 2024-01-11 PROCEDURE — 85004 AUTOMATED DIFF WBC COUNT: CPT | Performed by: EMERGENCY MEDICINE

## 2024-01-11 PROCEDURE — 36415 COLL VENOUS BLD VENIPUNCTURE: CPT | Performed by: EMERGENCY MEDICINE

## 2024-01-11 PROCEDURE — 96360 HYDRATION IV INFUSION INIT: CPT

## 2024-01-11 PROCEDURE — 99284 EMERGENCY DEPT VISIT MOD MDM: CPT | Mod: 25

## 2024-01-11 PROCEDURE — 93005 ELECTROCARDIOGRAM TRACING: CPT | Performed by: EMERGENCY MEDICINE

## 2024-01-11 PROCEDURE — 87637 SARSCOV2&INF A&B&RSV AMP PRB: CPT | Performed by: EMERGENCY MEDICINE

## 2024-01-11 RX ADMIN — SODIUM CHLORIDE 1000 ML: 9 INJECTION, SOLUTION INTRAVENOUS at 19:27

## 2024-01-11 ASSESSMENT — ENCOUNTER SYMPTOMS
SHORTNESS OF BREATH: 0
ARTHRALGIAS: 1
FEVER: 0
LIGHT-HEADEDNESS: 1
CHILLS: 1
VOMITING: 0
COUGH: 0
HEADACHES: 1
NAUSEA: 1
RHINORRHEA: 0

## 2024-01-11 NOTE — Clinical Note
Olvin Macias was seen and treated in our emergency department on 1/11/2024.  He may return to work on 01/15/2024.       If you have any questions or concerns, please don't hesitate to call.      Ilene Carmen PA-C

## 2024-01-12 LAB
ATRIAL RATE - MUSE: 108 BPM
DIASTOLIC BLOOD PRESSURE - MUSE: NORMAL MMHG
INTERPRETATION ECG - MUSE: NORMAL
P AXIS - MUSE: 18 DEGREES
PR INTERVAL - MUSE: 142 MS
QRS DURATION - MUSE: 76 MS
QT - MUSE: 294 MS
QTC - MUSE: 393 MS
R AXIS - MUSE: 45 DEGREES
SYSTOLIC BLOOD PRESSURE - MUSE: NORMAL MMHG
T AXIS - MUSE: 16 DEGREES
VENTRICULAR RATE- MUSE: 108 BPM

## 2024-01-12 NOTE — ED TRIAGE NOTES
Patient started feeling some dizziness, like lightheadedness yesterday, got better.  Since around 1500 today, was at work and started feeling these symptoms again. Slight headache and nausea.

## 2024-01-12 NOTE — ED PROVIDER NOTES
EMERGENCY DEPARTMENT ENCOUNTER      NAME: Olvin Macias  AGE: 23 year old male  YOB: 2000  MRN: [id number]  EVALUATION DATE & TIME: No admission date for patient encounter.    PCP: System, Provider Not In    ED PROVIDER: Ilene Carmen PA-C      Chief Complaint   Patient presents with    Dizziness       FINAL IMPRESSION:  1. Light headedness          ED COURSE & MEDICAL DECISION MAKING:    Pertinent Labs & Imaging studies reviewed. (See chart for details)    23 year old male presents to the Emergency Department for evaluation of lightheadedness.    Physical exam is remarkable for a generally well-appearing male who is in no acute distress.  Heart and lung sounds are clear diffusely throughout.  Clear rhinorrhea noted.  Abdomen is soft and nontender.  No focal neurologic deficits noted, patient moves all extremities without difficulty.  Cranial nerves III through XII appear grossly intact.  Vital signs remarkable for tachycardia initially which improved after IV fluids; patient initially did not have a fever however when his vitals were rechecked in the waiting room he did have a fever which I was not made aware of.    CBC remarkable for leukocytosis with white blood cell count of 12.9, hemoconcentrated with elevated hemoglobin of 17.9.  CMP unremarkable with no significant electrolyte derangements, normal liver and kidney function.  Troponin was negative, EKG with some nonischemic and nonspecific ST changes.  D-dimer within normal limits.  TSH within normal limits.  COVID/flu swab negative.    The patient was given IV fluids with improvement in his symptoms.  As previously noted, unfortunately patient had his vitals rechecked in the lobby where his care was received prior to discharge and I was not made aware of the fact that he remained tachycardic and febrile.  I did attempt to call the patient to further discuss his symptoms/possibly encouraged him to return to the ED for further evaluation however  I was unable to reach him or leave a message.    The patient was given strict return precautions.    Medical Decision Making  Obtained supplemental history:Supplemental history obtained?: No  Reviewed external records: External records reviewed?: Documented in chart  Care impacted by chronic illness:N/A  Care significantly affected by social determinants of health:Access to Medical Care  Did you consider but not order tests?: Work up considered but not performed and documented in chart, if applicable  Did you interpret images independently?: Independent interpretation of ECG and images noted in documentation, when applicable.  Consultation discussion with other provider:Did you involve another provider (consultant, , pharmacy, etc.)?: No  Discharge. No recommendations on prescription strength medication(s). See documentation for any additional details.    ED Course   6:50 PM Performed my initial history and physical exam. Discussed workup in the emergency department, management of symptoms, and likely disposition.   8:52 PM I discussed the plan for discharge with the patient or family and they are agreeable. We discussed supportive cares at home and reasons for return to the ER including new or worsening symptoms - all questions and concerns addressed. Patient to be discharged by RN.  9:16 PM I attempted to call the patient, no answer.    At the conclusion of the encounter I discussed the results of all of the tests and the disposition. The questions were answered. The patient or family acknowledged understanding and was agreeable with the care plan.     Voice recognition software was used in the creation of this note. Any grammatical or nonsensical errors are due to inherent errors with the software and are not the intention of the writer.     MEDICATIONS GIVEN IN THE EMERGENCY:  Medications   sodium chloride 0.9% BOLUS 1,000 mL (0 mLs Intravenous Stopped 1/11/24 2051)       NEW PRESCRIPTIONS STARTED AT TODAY'S  "ER VISIT  Discharge Medication List as of 1/11/2024  8:55 PM          =================================================================    HPI    Patient information was obtained from: Patient    Use of : N/A     Olvin Macias is a 23 year old male with no known pertinent medical history who presents to the ED by private car for evaluation of lightheadedness.     Patient reports that he was at work as a  and suddenly developed a lightheaded feeling around 3 PM. Also complains of accompanying headache and nausea without emesis. Also complains of chills and nasal congestion. No fever, cough, rhinorrhea, sore throat, or chest pain, or shortness of breath.     Of note, patient did eat a typical breakfast this AM. Does admit to occasional caffeine use, 1-2x per week. He denies smoking, drug use, or alcohol use.    REVIEW OF SYSTEMS   Review of Systems   Constitutional:  Positive for chills. Negative for fever.   HENT:  Positive for congestion. Negative for rhinorrhea.    Respiratory:  Negative for cough and shortness of breath.    Cardiovascular:  Negative for chest pain.   Gastrointestinal:  Positive for nausea. Negative for vomiting.   Musculoskeletal:  Positive for arthralgias (b/l hand \"cramping\").   Neurological:  Positive for light-headedness and headaches.       All other systems reviewed and are negative unless noted in HPI.    PAST MEDICAL HISTORY:  History reviewed. No pertinent past medical history.    PAST SURGICAL HISTORY:  History reviewed. No pertinent surgical history.    CURRENT MEDICATIONS:    cetirizine-pseudoephedrine (ZYRTEC-D) 5-120 mg per tablet        ALLERGIES:  Allergies   Allergen Reactions    Nka [No Known Allergies]        FAMILY HISTORY:  No family history on file.    SOCIAL HISTORY:   Social History     Socioeconomic History    Marital status: Single   Tobacco Use    Smoking status: Never     Social Determinants of Health     Financial Resource Strain: Low Risk  " "(12/22/2023)    Financial Resource Strain     Within the past 12 months, have you or your family members you live with been unable to get utilities (heat, electricity) when it was really needed?: No   Food Insecurity: Low Risk  (12/22/2023)    Food Insecurity     Within the past 12 months, did you worry that your food would run out before you got money to buy more?: No     Within the past 12 months, did the food you bought just not last and you didn t have money to get more?: No   Transportation Needs: Low Risk  (12/22/2023)    Transportation Needs     Within the past 12 months, has lack of transportation kept you from medical appointments, getting your medicines, non-medical meetings or appointments, work, or from getting things that you need?: No   Interpersonal Safety: Low Risk  (12/22/2023)    Interpersonal Safety     Do you feel physically and emotionally safe where you currently live?: Yes     Within the past 12 months, have you been hit, slapped, kicked or otherwise physically hurt by someone?: No     Within the past 12 months, have you been humiliated or emotionally abused in other ways by your partner or ex-partner?: No   Housing Stability: High Risk (12/22/2023)    Housing Stability     Do you have housing? : No     Are you worried about losing your housing?: No       VITALS:  Patient Vitals for the past 24 hrs:   BP Temp Temp src Pulse Resp SpO2 Height Weight   01/11/24 2057 121/63 (!) 100.7  F (38.2  C) Oral 111 18 99 % -- --   01/11/24 1833 134/89 98.1  F (36.7  C) Oral (!) 122 16 99 % 1.651 m (5' 5\") 79.4 kg (175 lb)       PHYSICAL EXAM    VITAL SIGNS: /63   Pulse 111   Temp (!) 100.7  F (38.2  C) (Oral)   Resp 18   Ht 1.651 m (5' 5\")   Wt 79.4 kg (175 lb)   SpO2 99%   BMI 29.12 kg/m    General Appearance: Alert, cooperative, normal speech and facial symmetry, appears stated age, the patient does not appear in distress  Head:  Normocephalic, without obvious abnormality, atraumatic  Eyes: " Conjunctiva/corneas clear, EOM's intact, no nystagmus, PERRL  ENT:  Lips, mucosa, and tongue normal; teeth and gums normal, no pharyngeal inflammation, no dysphonia or difficulty swallowing, membranes are moist without pallor  Cardio:  Regular rate and rhythm, S1 and S2 normal, no murmur, rub    or gallop, 2+ pulses symmetric in all extremities  Pulm:  Clear to auscultation bilaterally, respirations unlabored with no accessory muscle use  Abdomen:  Abdomen is soft, non-distended with no tenderness to palpation, rebound tenderness, or guarding.   Back: No midline tenderness or step-offs, no CVA tenderness  Extremities:  Extremities normal, there is no tenderness to palpation, atraumatic, no cyanosis or edema, full function and range of motion, pulses equal in all extremities, normal cap refill, no joint swelling  Neuro: Patient is awake, alert, and responsive to voice. No gross motor weaknesses or sensory loss; moves all extremities.Cranial Nerves:  CN2: No funduscopic exam performed. CN3,4 & 6: Pupillary light response, lateral and vertical gaze normal.  No nystagmus.  CN7: No facial weakness, smile, facial symmetry intact. CN8: Intact to spoken voice. CN9&10: Gag reflex, uvula midline, palate rises with phonation. CN11: Shoulder shrug 5/5 intact bilaterally. CN12: Tongue midline and moves freely from side to side.      LAB:  All pertinent labs reviewed and interpreted.  Labs Ordered and Resulted from Time of ED Arrival to Time of ED Departure   COMPREHENSIVE METABOLIC PANEL - Abnormal       Result Value    Sodium 137      Potassium 4.0      Carbon Dioxide (CO2) 26      Anion Gap 12      Urea Nitrogen 21.0 (*)     Creatinine 1.04      GFR Estimate >90      Calcium 9.4      Chloride 99      Glucose 102 (*)     Alkaline Phosphatase 85      AST 23      ALT 41      Protein Total 8.0      Albumin 4.7      Bilirubin Total 0.6     CBC WITH PLATELETS AND DIFFERENTIAL - Abnormal    WBC Count 12.9 (*)     RBC Count 6.26 (*)      Hemoglobin 17.9 (*)     Hematocrit 52.3      MCV 84      MCH 28.6      MCHC 34.2      RDW 11.2      Platelet Count 230      % Neutrophils 92      % Lymphocytes 3      % Monocytes 4      % Eosinophils 1      % Basophils 0      % Immature Granulocytes 0      NRBCs per 100 WBC 0      Absolute Neutrophils 11.9 (*)     Absolute Lymphocytes 0.4 (*)     Absolute Monocytes 0.5      Absolute Eosinophils 0.1      Absolute Basophils 0.0      Absolute Immature Granulocytes 0.0      Absolute NRBCs 0.0     MAGNESIUM - Normal    Magnesium 1.9     TROPONIN T, HIGH SENSITIVITY - Normal    Troponin T, High Sensitivity <6     TSH WITH FREE T4 REFLEX - Normal    TSH 1.34     INFLUENZA A/B, RSV, & SARS-COV2 PCR - Normal    Influenza A PCR Negative      Influenza B PCR Negative      RSV PCR Negative      SARS CoV2 PCR Negative     D DIMER QUANTITATIVE - Normal    D-Dimer Quantitative <0.27         RADIOLOGY:  Reviewed all pertinent imaging. Please see official radiology report.  No orders to display       EKG:    Performed at: 1/11/2024 at 19:19    I have independently reviewed and interpreted the EKG, along with the final read. EKG also reviewed by Dr. Land.    Ventricular rate 108 bpm  NY interval 142 ms  QRS duration 76 ms  QT/QTc 294/393  P-R-T axes 18-45-16    Impression: Sinus tachycardia. Nonspecific T wave abnormality in V1/v2 and ST depression in lead III.       I, Ray Kan, am serving as a scribe to document services personally performed by Ilene Carmen PA-C based on my observation and the provider's statements to me. I, Ilene Carmen PA-C attest that Ray Kan is acting in a scribe capacity, has observed my performance of the services and has documented them in accordance with my direction.     Ilene Carmen PA-C  Emergency Medicine  [facility] EMERGENCY DEPARTMENT  [address] [facility] [phone]  Dept: [phone]        Ilene Carmen PA-C  01/11/24 0876

## 2024-01-12 NOTE — DISCHARGE INSTRUCTIONS
You were seen here today for evaluation of lightheadedness.  Your lab work today is reassuring with no evidence of infections, heart attack, blood clots, or liver/kidney dysfunction.  As we discussed, your EKG was slightly abnormal so I have placed a referral to the rapid access cardiology clinic.  They should call you to schedule an appointment.    Make sure you are drinking plenty of fluids and resting.  Return here for any new or worsening symptoms including severe pain, fever, passing out, worsening lightheadedness/dizziness, difficulty breathing, chest pain, or any other symptoms that concern you.

## 2024-01-17 ENCOUNTER — OFFICE VISIT (OUTPATIENT)
Dept: CARDIOLOGY | Facility: CLINIC | Age: 24
End: 2024-01-17
Payer: COMMERCIAL

## 2024-01-17 VITALS
HEART RATE: 83 BPM | SYSTOLIC BLOOD PRESSURE: 112 MMHG | DIASTOLIC BLOOD PRESSURE: 64 MMHG | WEIGHT: 170.1 LBS | BODY MASS INDEX: 28.34 KG/M2 | OXYGEN SATURATION: 98 % | HEIGHT: 65 IN | RESPIRATION RATE: 20 BRPM

## 2024-01-17 DIAGNOSIS — R42 LIGHT HEADEDNESS: ICD-10-CM

## 2024-01-17 DIAGNOSIS — Z13.220 SCREENING FOR LIPID DISORDERS: ICD-10-CM

## 2024-01-17 LAB
CHOLEST SERPL-MCNC: 125 MG/DL
FASTING STATUS PATIENT QL REPORTED: YES
HDLC SERPL-MCNC: 26 MG/DL
LDLC SERPL CALC-MCNC: 78 MG/DL
NONHDLC SERPL-MCNC: 99 MG/DL
TRIGL SERPL-MCNC: 105 MG/DL

## 2024-01-17 PROCEDURE — 99244 OFF/OP CNSLTJ NEW/EST MOD 40: CPT | Performed by: GENERAL ACUTE CARE HOSPITAL

## 2024-01-17 PROCEDURE — 80061 LIPID PANEL: CPT | Performed by: GENERAL ACUTE CARE HOSPITAL

## 2024-01-17 PROCEDURE — 36415 COLL VENOUS BLD VENIPUNCTURE: CPT | Performed by: GENERAL ACUTE CARE HOSPITAL

## 2024-01-17 NOTE — PATIENT INSTRUCTIONS
No further testing needed at this time.  If you have more chest pain or lightheadedness, we can consider a stress test and a heart monitor.

## 2024-01-17 NOTE — LETTER
1/17/2024    Provider Not In System       RE: Olvin Macias       Dear Colleague,     I had the pleasure of seeing Olvin Macias in the [facility].  HEART CARE ENCOUNTER NOTE      Thank you, Ilene Carmen PA-C, for asking the [facility] team to see Mr. Olvin Macias to evaluate dizziness and chest pain.      Assessment/Recommendations   Assessment:    Dizziness and chest pain. These both seem noncardiac, possibly due to an undiagnosed infection which has since resolved.  Screening for lipid disorders.  Body mass index is 28.31 kg/m .    Plan:  Lipid panel today.  If his symptoms recur, we can consider exercise stress electrocardiography as well as a 24-hour Holter monitor.  Follow-up as needed.         History of Present Illness   Mr. Olvin Macias is a 23 year old male with no significant past history presenting for urgent evaluation of dizziness and chest pain. He was seen in the ED for this 1/11/2024.    He works as a  at [employer] and while working, he felt dizziness and a headache. He also had a dull ache across his chest. He went to the ED and was noted to be tachycardic with a temperature of 100.7F. ECG showed sinus tachycardia. D-dimer and hs-troponin were both undetectable. Respiratory panel was negative for influenza, RSV and COVID. He was given a bolus of IV fluid and felt better.    His symptoms have not returned. He is not very physically active but can still do any activity he wants without symptoms. He very rarely feels palpitations, maybe once per months.No exertional chest pain/pressure/tightness, shortness of breath at rest or with exertion, light headedness/dizziness, pre-syncope, syncope, lower extremity swelling, paroxysmal nocturnal dyspnea (PND), or orthopnea.       Cardiac Problems and Cardiac Diagnostics     Most Recent Cardiac testing:  ECG dated 1/11/2024 (personaly reviewed and interpreted): sinus tachycardia, nonspecific T wave abnormality    "    Medications  Allergies   Current Outpatient Medications   Medication Sig Dispense Refill    cetirizine-pseudoephedrine (ZYRTEC-D) 5-120 mg per tablet [CETIRIZINE-PSEUDOEPHEDRINE (ZYRTEC-D) 5-120 MG PER TABLET] Take 1 tablet by mouth 2 (two) times a day. 20 tablet 0      Allergies   Allergen Reactions    Nka [No Known Allergies]         Physical Examination Review of Systems   /64 (BP Location: Left arm, Patient Position: Sitting, Cuff Size: Adult Regular)   Pulse 83   Resp 20   Ht 1.651 m (5' 5\")   Wt 77.2 kg (170 lb 1.6 oz)   SpO2 98%   BMI 28.31 kg/m    Body mass index is 28.31 kg/m .  Wt Readings from Last 3 Encounters:   01/17/24 77.2 kg (170 lb 1.6 oz)   01/11/24 79.4 kg (175 lb)   12/22/23 79.5 kg (175 lb 3.2 oz)       General Appearance:   Pleasant  male, appears  stated age. no acute distress, normal body habitus   ENT/Mouth: membranes moist, no apparent gingival bleeding.      EYES:  no scleral icterus, normal conjunctivae   Neck: no carotid bruits. No anterior cervical lymphadenopaty   Respiratory:   lungs are clear to auscultation, no rales or wheezing, equal chest wall expansion    Cardiovascular:   Regular rhythm, normal rate. Normal first and second heart sounds with no murmurs, rubs, or gallops; the carotid, radial and posterior tibial pulses are intact, Jugular venous pressure normal, no edema bilaterally    Abdomen/GI:  no organomegaly, masses, bruits, or tenderness; bowel sounds are present   Extremities: no cyanosis or clubbing   Skin: no xanthelasma, warm.    Heme/lymph/ Immunology No apparent bleeding noted.   Neurologic: Alert and oriented. normal gait, no tremors     Psychiatric: Pleasant, calm, appropriate affect.    A complete 10 system review of systems was performed and is negative except as mentioned in the HPI/subjective.         Past History   Past Medical History:   Past Medical History:   Diagnosis Date    No pertinent past medical history        Past Surgical History: "   Past Surgical History:   Procedure Laterality Date    WISDOM TOOTH EXTRACTION         Family History:   Family History   Problem Relation Age of Onset    Heart Disease No family hx of         Social History:   Social History     Socioeconomic History    Marital status: Single     Spouse name: Not on file    Number of children: Not on file    Years of education: Not on file    Highest education level: Not on file   Occupational History    Not on file   Tobacco Use    Smoking status: Never    Smokeless tobacco: Not on file   Substance and Sexual Activity    Alcohol use: Not on file    Drug use: Not on file    Sexual activity: Not on file   Other Topics Concern    Not on file   Social History Narrative    Not on file     Social Determinants of Health     Financial Resource Strain: Low Risk  (12/22/2023)    Financial Resource Strain     Within the past 12 months, have you or your family members you live with been unable to get utilities (heat, electricity) when it was really needed?: No   Food Insecurity: Low Risk  (12/22/2023)    Food Insecurity     Within the past 12 months, did you worry that your food would run out before you got money to buy more?: No     Within the past 12 months, did the food you bought just not last and you didn t have money to get more?: No   Transportation Needs: Low Risk  (12/22/2023)    Transportation Needs     Within the past 12 months, has lack of transportation kept you from medical appointments, getting your medicines, non-medical meetings or appointments, work, or from getting things that you need?: No   Physical Activity: Not on file   Stress: Not on file   Social Connections: Not on file   Interpersonal Safety: Low Risk  (12/22/2023)    Interpersonal Safety     Do you feel physically and emotionally safe where you currently live?: Yes     Within the past 12 months, have you been hit, slapped, kicked or otherwise physically hurt by someone?: No     Within the past 12 months, have you  been humiliated or emotionally abused in other ways by your partner or ex-partner?: No   Housing Stability: High Risk (12/22/2023)    Housing Stability     Do you have housing? : No     Are you worried about losing your housing?: No              Lab Results    Chemistry/lipid CBC Cardiac Enzymes/BNP/TSH/INR   Lab Results   Component Value Date    CR 1.04 01/11/2024    BUN 21.0 (H) 01/11/2024    POTASSIUM 4.0 01/11/2024     01/11/2024    CO2 26 01/11/2024      Lab Results   Component Value Date    WBC 12.9 (H) 01/11/2024    HGB 17.9 (H) 01/11/2024    HCT 52.3 01/11/2024    MCV 84 01/11/2024     01/11/2024    Lab Results   Component Value Date    TSH 1.34 01/11/2024          Darren Culp MD [facility]  Non-Invasive Cardiologist  [facility]  Pager [phone]      Thank you for allowing me to participate in the care of your patient.      Sincerely,     Darren Culp MD     [facility]  cc:   Ilene Carmen PA-C  [facility] [place] [address]

## 2024-01-17 NOTE — PROGRESS NOTES
HEART CARE ENCOUNTER NOTE      Thank you, Ilene Carmen PA-C, for asking the [facility] Heart Care team to see Mr. Olvin Macias to evaluate dizziness and chest pain.      Assessment/Recommendations   Assessment:    Dizziness and chest pain. These both seem noncardiac, possibly due to an undiagnosed infection which has since resolved.  Screening for lipid disorders.  Body mass index is 28.31 kg/m .    Plan:  Lipid panel today.  If his symptoms recur, we can consider exercise stress electrocardiography as well as a 24-hour Holter monitor.  Follow-up as needed.         History of Present Illness   Mr. Olvin Macias is a 23 year old male with no significant past history presenting for urgent evaluation of dizziness and chest pain. He was seen in the ED for this 1/11/2024.    He works as a  at [employer] and while working, he felt dizziness and a headache. He also had a dull ache across his chest. He went to the ED and was noted to be tachycardic with a temperature of 100.7F. ECG showed sinus tachycardia. D-dimer and hs-troponin were both undetectable. Respiratory panel was negative for influenza, RSV and COVID. He was given a bolus of IV fluid and felt better.    His symptoms have not returned. He is not very physically active but can still do any activity he wants without symptoms. He very rarely feels palpitations, maybe once per months.No exertional chest pain/pressure/tightness, shortness of breath at rest or with exertion, light headedness/dizziness, pre-syncope, syncope, lower extremity swelling, paroxysmal nocturnal dyspnea (PND), or orthopnea.       Cardiac Problems and Cardiac Diagnostics     Most Recent Cardiac testing:  ECG dated 1/11/2024 (personaly reviewed and interpreted): sinus tachycardia, nonspecific T wave abnormality       Medications  Allergies   Current Outpatient Medications   Medication Sig Dispense Refill    cetirizine-pseudoephedrine (ZYRTEC-D) 5-120 mg per tablet  "[CETIRIZINE-PSEUDOEPHEDRINE (ZYRTEC-D) 5-120 MG PER TABLET] Take 1 tablet by mouth 2 (two) times a day. 20 tablet 0      Allergies   Allergen Reactions    Nka [No Known Allergies]         Physical Examination Review of Systems   /64 (BP Location: Left arm, Patient Position: Sitting, Cuff Size: Adult Regular)   Pulse 83   Resp 20   Ht 1.651 m (5' 5\")   Wt 77.2 kg (170 lb 1.6 oz)   SpO2 98%   BMI 28.31 kg/m    Body mass index is 28.31 kg/m .  Wt Readings from Last 3 Encounters:   01/17/24 77.2 kg (170 lb 1.6 oz)   01/11/24 79.4 kg (175 lb)   12/22/23 79.5 kg (175 lb 3.2 oz)       General Appearance:   Pleasant  male, appears  stated age. no acute distress, normal body habitus   ENT/Mouth: membranes moist, no apparent gingival bleeding.      EYES:  no scleral icterus, normal conjunctivae   Neck: no carotid bruits. No anterior cervical lymphadenopaty   Respiratory:   lungs are clear to auscultation, no rales or wheezing, equal chest wall expansion    Cardiovascular:   Regular rhythm, normal rate. Normal first and second heart sounds with no murmurs, rubs, or gallops; the carotid, radial and posterior tibial pulses are intact, Jugular venous pressure normal, no edema bilaterally    Abdomen/GI:  no organomegaly, masses, bruits, or tenderness; bowel sounds are present   Extremities: no cyanosis or clubbing   Skin: no xanthelasma, warm.    Heme/lymph/ Immunology No apparent bleeding noted.   Neurologic: Alert and oriented. normal gait, no tremors     Psychiatric: Pleasant, calm, appropriate affect.    A complete 10 system review of systems was performed and is negative except as mentioned in the HPI/subjective.         Past History   Past Medical History:   Past Medical History:   Diagnosis Date    No pertinent past medical history        Past Surgical History:   Past Surgical History:   Procedure Laterality Date    WISDOM TOOTH EXTRACTION         Family History:   Family History   Problem Relation Age of Onset "    Heart Disease No family hx of         Social History:   Social History     Socioeconomic History    Marital status: Single     Spouse name: Not on file    Number of children: Not on file    Years of education: Not on file    Highest education level: Not on file   Occupational History    Not on file   Tobacco Use    Smoking status: Never    Smokeless tobacco: Not on file   Substance and Sexual Activity    Alcohol use: Not on file    Drug use: Not on file    Sexual activity: Not on file   Other Topics Concern    Not on file   Social History Narrative    Not on file     Social Determinants of Health     Financial Resource Strain: Low Risk  (12/22/2023)    Financial Resource Strain     Within the past 12 months, have you or your family members you live with been unable to get utilities (heat, electricity) when it was really needed?: No   Food Insecurity: Low Risk  (12/22/2023)    Food Insecurity     Within the past 12 months, did you worry that your food would run out before you got money to buy more?: No     Within the past 12 months, did the food you bought just not last and you didn t have money to get more?: No   Transportation Needs: Low Risk  (12/22/2023)    Transportation Needs     Within the past 12 months, has lack of transportation kept you from medical appointments, getting your medicines, non-medical meetings or appointments, work, or from getting things that you need?: No   Physical Activity: Not on file   Stress: Not on file   Social Connections: Not on file   Interpersonal Safety: Low Risk  (12/22/2023)    Interpersonal Safety     Do you feel physically and emotionally safe where you currently live?: Yes     Within the past 12 months, have you been hit, slapped, kicked or otherwise physically hurt by someone?: No     Within the past 12 months, have you been humiliated or emotionally abused in other ways by your partner or ex-partner?: No   Housing Stability: High Risk (12/22/2023)    Housing Stability      Do you have housing? : No     Are you worried about losing your housing?: No              Lab Results    Chemistry/lipid CBC Cardiac Enzymes/BNP/TSH/INR   Lab Results   Component Value Date    CR 1.04 01/11/2024    BUN 21.0 (H) 01/11/2024    POTASSIUM 4.0 01/11/2024     01/11/2024    CO2 26 01/11/2024      Lab Results   Component Value Date    WBC 12.9 (H) 01/11/2024    HGB 17.9 (H) 01/11/2024    HCT 52.3 01/11/2024    MCV 84 01/11/2024     01/11/2024    Lab Results   Component Value Date    TSH 1.34 01/11/2024          Darren Culp MD [facility]  Non-Invasive Cardiologist  [facility]  Pager [phone]

## 2024-02-17 ENCOUNTER — HEALTH MAINTENANCE LETTER (OUTPATIENT)
Age: 24
End: 2024-02-17

## 2024-04-26 ENCOUNTER — OFFICE VISIT (OUTPATIENT)
Dept: FAMILY MEDICINE | Facility: CLINIC | Age: 24
End: 2024-04-26
Payer: COMMERCIAL

## 2024-04-26 VITALS
TEMPERATURE: 98.7 F | BODY MASS INDEX: 30.01 KG/M2 | HEART RATE: 59 BPM | OXYGEN SATURATION: 98 % | RESPIRATION RATE: 16 BRPM | SYSTOLIC BLOOD PRESSURE: 114 MMHG | WEIGHT: 180.1 LBS | DIASTOLIC BLOOD PRESSURE: 78 MMHG | HEIGHT: 65 IN

## 2024-04-26 DIAGNOSIS — Z11.3 SCREEN FOR STD (SEXUALLY TRANSMITTED DISEASE): ICD-10-CM

## 2024-04-26 DIAGNOSIS — R61 GENERALIZED HYPERHIDROSIS: ICD-10-CM

## 2024-04-26 DIAGNOSIS — Z11.59 NEED FOR HEPATITIS C SCREENING TEST: ICD-10-CM

## 2024-04-26 DIAGNOSIS — Z11.4 SCREENING FOR HIV (HUMAN IMMUNODEFICIENCY VIRUS): Primary | ICD-10-CM

## 2024-04-26 LAB
C TRACH DNA SPEC QL PROBE+SIG AMP: NEGATIVE
HCV AB SERPL QL IA: NONREACTIVE
HIV 1+2 AB+HIV1 P24 AG SERPL QL IA: NONREACTIVE
N GONORRHOEA DNA SPEC QL NAA+PROBE: NEGATIVE

## 2024-04-26 PROCEDURE — 86803 HEPATITIS C AB TEST: CPT

## 2024-04-26 PROCEDURE — 99213 OFFICE O/P EST LOW 20 MIN: CPT

## 2024-04-26 PROCEDURE — 87491 CHLMYD TRACH DNA AMP PROBE: CPT

## 2024-04-26 PROCEDURE — 36415 COLL VENOUS BLD VENIPUNCTURE: CPT

## 2024-04-26 PROCEDURE — 87591 N.GONORRHOEAE DNA AMP PROB: CPT

## 2024-04-26 PROCEDURE — 87389 HIV-1 AG W/HIV-1&-2 AB AG IA: CPT

## 2024-04-26 ASSESSMENT — ENCOUNTER SYMPTOMS
ACTIVITY CHANGE: 0
COUGH: 0
DIFFICULTY URINATING: 0
DIAPHORESIS: 1
FEVER: 0
APPETITE CHANGE: 0
SHORTNESS OF BREATH: 0
FATIGUE: 0
ADENOPATHY: 0
DYSURIA: 0
WHEEZING: 0

## 2024-04-26 ASSESSMENT — PAIN SCALES - GENERAL: PAINLEVEL: NO PAIN (0)

## 2024-04-26 NOTE — LETTER
May 15, 2024      Olvin Macias  [address] [address]        Dear ,    We are writing to inform you of your test results.    {results letter list:[id number]}    Resulted Orders   Chlamydia & Gonorrhea by PCR, [facility] - Clinic Collect   Result Value Ref [facility]    Chlamydia Trachomatis Negative Negative      Comment:      Negative for C. trachomatis rRNA by transcription mediated amplification.   A negative result by transcription mediated amplification does not preclude the presence of infection because results are dependent on proper and adequate collection, absence of inhibitors and sufficient rRNA to be detected.    Neisseria gonorrhoeae Negative Negative      Comment:      Negative for N. gonorrhoeae rRNA by transcription mediated amplification. A negative result by transcription mediated amplification does not preclude the presence of C. trachomatis infection because results are dependent on proper and adequate collection, absence of inhibitors and sufficient rRNA to be detected.   HIV Antigen Antibody Combo   Result Value Ref [facility]    HIV Antigen Antibody Combo Nonreactive Nonreactive      Comment:      Negative HIV-1 p24 antigen and HIV-1/2 antibody screening test results usually indicate the absence of HIV-1 and HIV-2 infection. However, such negative results do not rule-out acute HIV infection.  If acute HIV-1 or HIV-2 infection is suspected, detection of HIV-1 or HIV-2 RNA  is recommended.    Hepatitis C Screen Reflex to HCV RNA Quant and Genotype   Result Value Ref [facility]    Hepatitis C Antibody Nonreactive Nonreactive      Comment:      A nonreactive screening test result does not exclude the possibility of exposure to or infection with HCV. Nonreactive screening test results in individuals with prior exposure to HCV may be due to antibody levels below the limit of detection of this assay or lack of reactivity to the HCV antigens used in this assay. Patients with recent HCV infections (<3  months from time of exposure) may have false-negative HCV antibody results due to the time needed for seroconversion (average of 8 to 9 weeks).       If you have any questions or concerns, please call the clinic at the number listed above.       Sincerely,      GAIL Samuels CNP
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
## 2024-05-15 ENCOUNTER — TELEPHONE (OUTPATIENT)
Dept: FAMILY MEDICINE | Facility: CLINIC | Age: 24
End: 2024-05-15
Payer: COMMERCIAL

## 2024-05-15 NOTE — TELEPHONE ENCOUNTER
----- Message from GAIL Samuels CNP sent at 5/15/2024  7:36 AM CDT -----  Please call with results below. Thank you.

## 2024-07-01 ENCOUNTER — OFFICE VISIT (OUTPATIENT)
Dept: FAMILY MEDICINE | Facility: CLINIC | Age: 24
End: 2024-07-01
Payer: COMMERCIAL

## 2024-07-01 VITALS
TEMPERATURE: 98 F | DIASTOLIC BLOOD PRESSURE: 88 MMHG | RESPIRATION RATE: 20 BRPM | OXYGEN SATURATION: 98 % | HEART RATE: 80 BPM | SYSTOLIC BLOOD PRESSURE: 142 MMHG

## 2024-07-01 DIAGNOSIS — J06.9 VIRAL URI: Primary | ICD-10-CM

## 2024-07-01 DIAGNOSIS — J02.9 SORE THROAT: ICD-10-CM

## 2024-07-01 LAB
DEPRECATED S PYO AG THROAT QL EIA: NEGATIVE
GROUP A STREP BY PCR: DETECTED
SARS-COV-2 RNA RESP QL NAA+PROBE: NEGATIVE

## 2024-07-01 PROCEDURE — 99213 OFFICE O/P EST LOW 20 MIN: CPT | Performed by: NURSE PRACTITIONER

## 2024-07-01 PROCEDURE — 87651 STREP A DNA AMP PROBE: CPT | Performed by: NURSE PRACTITIONER

## 2024-07-01 PROCEDURE — 87635 SARS-COV-2 COVID-19 AMP PRB: CPT | Performed by: NURSE PRACTITIONER

## 2024-07-01 ASSESSMENT — ENCOUNTER SYMPTOMS
SHORTNESS OF BREATH: 0
FEVER: 0
SORE THROAT: 1

## 2024-07-01 NOTE — PROGRESS NOTES
Assessment & Plan     Sore throat    - Streptococcus A Rapid Screen w/Reflex to PCR  - Group A Streptococcus PCR Throat Swab    Viral URI    - Symptomatic COVID-19 Virus (Coronavirus) by PCR Nasopharyngeal       Focused exam and history done due to COVID-19 pandemic in a walk-in setting.      History, exam, and vital signs consistent with a viral URI.  Strep test done by medical assistant without discussion with me was negative.  Discussed usual URI timeframe.    No red flags.     Recheck if shortness of breath or new fevers develop.  Rest.     OTCs recommended: None [   ].  Dextromethorphan  [  x], guaifenesin [ x ], pseudoephedrine [   ], Afrin for no greater than 3 days [  ], Tylenol or ibuprofen [ x ].                Return in about 10 days (around 7/11/2024) for If no better.    Gloria Ocampo, [facility]     Olvin Hernandez is a 24 year old male who presents to clinic today for the following health issues:  Chief Complaint   Patient presents with    Pharyngitis     Sore throat X5 days      Pharyngitis   Pertinent negatives include no shortness of breath.       ST rated 2/10 with congestion and cough. Maybe a fever at onset.  Denies shortness of breath or recent fevers.    Sx x 1 week.    GF here with similar.           Review of Systems   Constitutional:  Negative for fever.   HENT:  Positive for sore throat.    Respiratory:  Negative for shortness of breath.            Objective    BP (!) 142/88   Pulse 80   Temp 98  F (36.7  C)   Resp 20   SpO2 98%   Physical Exam  Constitutional:       Appearance: He is well-developed.   HENT:      Right Ear: External ear normal.      Left Ear: External ear normal.      Nose: Congestion present.      Mouth/Throat:      Pharynx: Posterior oropharyngeal erythema (posterior OP) present. No oropharyngeal exudate.      Tonsils: No tonsillar exudate or tonsillar abscesses. 0 on the right. 0 on the left.      Comments: Can't see tonsils    Eyes:      General:         Right eye: No discharge.         Left eye: No discharge.      Conjunctiva/sclera: Conjunctivae normal.   Pulmonary:      Effort: Pulmonary effort is normal.      Breath sounds: Normal breath sounds.      Comments: Throat clearing /cough   Musculoskeletal:         General: Normal range of motion.   Lymphadenopathy:      Cervical: No cervical adenopathy.   Skin:     General: Skin is warm.   Neurological:      Mental Status: He is alert and oriented to person, place, and time.   Psychiatric:         Behavior: Behavior normal.         Thought Content: Thought content normal.         Judgment: Judgment normal.            Results for orders placed or performed in visit on 07/01/24 (from the past 24 hour(s))   Streptococcus A Rapid Screen w/Reflex to PCR    Specimen: Throat; Swab   Result Value Ref [facility]    Group A Strep antigen Negative Negative

## 2024-07-01 NOTE — LETTER
July 1, 2024      Olvin Macias  [address] [address]        To Whom It May Concern:    Olvin Macias  was seen on 7/1.  Please excuse him  until 7/2 due to illness.        Sincerely,        Gloria Ocampo, CNP

## 2024-07-01 NOTE — PATIENT INSTRUCTIONS
This is likely a cold virus.  This last up to 2-3 weeks.  Take your fever cough and cold medication as needed.  Tylenol or ibuprofen as needed for aches and pains.  Recheck if severe sore throat, fevers or shortness of breath

## 2024-07-02 ENCOUNTER — TELEPHONE (OUTPATIENT)
Dept: FAMILY MEDICINE | Facility: CLINIC | Age: 24
End: 2024-07-02
Payer: COMMERCIAL

## 2024-07-02 DIAGNOSIS — J02.0 STREP THROAT: Primary | ICD-10-CM

## 2024-07-02 RX ORDER — PENICILLIN V POTASSIUM 500 MG/1
500 TABLET, FILM COATED ORAL 2 TIMES DAILY
Qty: 20 TABLET | Refills: 0 | Status: SHIPPED | OUTPATIENT
Start: 2024-07-02 | End: 2024-07-12

## 2024-07-02 NOTE — TELEPHONE ENCOUNTER
Patient's confirmatory culture was positive for strep.  Leo RODRÍGUEZ was sent in for 10 days of treatment.    Advised patient to be noncontagious after 24 hours and switching toothbrush in 48 hours and following up if not getting good resolution with course treatment.    Questions were answered to his satisfaction before conclusion of telephone conversation.

## 2025-03-08 ENCOUNTER — HEALTH MAINTENANCE LETTER (OUTPATIENT)
Age: 25
End: 2025-03-08

## 2025-03-27 ENCOUNTER — HOSPITAL ENCOUNTER (EMERGENCY)
Facility: HOSPITAL | Age: 25
Discharge: HOME | End: 2025-03-27

## 2025-03-27 VITALS
SYSTOLIC BLOOD PRESSURE: 133 MMHG | RESPIRATION RATE: 16 BRPM | BODY MASS INDEX: 33.63 KG/M2 | WEIGHT: 197 LBS | OXYGEN SATURATION: 97 % | HEART RATE: 87 BPM | DIASTOLIC BLOOD PRESSURE: 72 MMHG | TEMPERATURE: 98.2 F | HEIGHT: 64 IN

## 2025-03-27 DIAGNOSIS — H11.422 CHEMOSIS OF CONJUNCTIVA, LEFT: ICD-10-CM

## 2025-03-27 PROCEDURE — 250N000009 HC RX 250

## 2025-03-27 PROCEDURE — 99283 EMERGENCY DEPT VISIT LOW MDM: CPT

## 2025-03-27 RX ORDER — TETRACAINE HYDROCHLORIDE 5 MG/ML
1-2 SOLUTION OPHTHALMIC ONCE
Status: COMPLETED | OUTPATIENT
Start: 2025-03-27 | End: 2025-03-27

## 2025-03-27 RX ADMIN — TETRACAINE HYDROCHLORIDE 2 DROP: 5 SOLUTION OPHTHALMIC at 16:00

## 2025-03-27 RX ADMIN — FLUORESCEIN SODIUM 1 STRIP: 1 STRIP OPHTHALMIC at 16:01

## 2025-03-27 ASSESSMENT — COLUMBIA-SUICIDE SEVERITY RATING SCALE - C-SSRS
1. IN THE PAST MONTH, HAVE YOU WISHED YOU WERE DEAD OR WISHED YOU COULD GO TO SLEEP AND NOT WAKE UP?: NO
6. HAVE YOU EVER DONE ANYTHING, STARTED TO DO ANYTHING, OR PREPARED TO DO ANYTHING TO END YOUR LIFE?: NO
2. HAVE YOU ACTUALLY HAD ANY THOUGHTS OF KILLING YOURSELF IN THE PAST MONTH?: NO

## 2025-03-27 ASSESSMENT — VISUAL ACUITY
OU: 20/30
OS: 20/40
OD: 20/30

## 2025-03-27 ASSESSMENT — ACTIVITIES OF DAILY LIVING (ADL): ADLS_ACUITY_SCORE: 41

## 2025-03-27 NOTE — Clinical Note
Olvin Macias was seen and treated in our emergency department on 3/27/2025.  He may return to work on 03/28/2025.       If you have any questions or concerns, please don't hesitate to call.      Jessy Davenport PA-C

## 2025-03-27 NOTE — DISCHARGE INSTRUCTIONS
You were seen in the emergency department for evaluation of left eye abnormality.  You are experiencing what is called chemosis.  This is inflammation of the top layer of the eye.  This is likely from you continuing to rub your eye.  This will go away on its own.  Avoid rubbing your eye any further.  You can use the lubricating eyedrops that I prescribed you as needed.    Sometimes this can also be from an allergic process.  You can try starting 10 mg of Zyrtec, cetirizine, daily to see if this helps with your eye itching as well.    Please follow-up with the eye doctor to make sure this is getting better.    Return to the emergency department for vision changes, eye bulging out of your head, fever, or any other concerning symptoms.

## 2025-03-27 NOTE — ED TRIAGE NOTES
Eyes have been bothering him for about one week. About 1 hour ago reached up and scratched left eye. Able to see clearly, but eye is red and puffy. Edema noted. Pain at 1/10   Triage Assessment (Adult)       Row Name 03/27/25 1512          Triage Assessment    Airway WDL WDL        Respiratory WDL    Respiratory WDL WDL        Skin Circulation/Temperature WDL    Skin Circulation/Temperature WDL WDL        Cardiac WDL    Cardiac WDL WDL        Peripheral/Neurovascular WDL    Peripheral Neurovascular WDL WDL        Cognitive/Neuro/Behavioral WDL    Cognitive/Neuro/Behavioral WDL WDL

## 2025-03-28 NOTE — ED PROVIDER NOTES
Emergency Department Encounter   NAME: Olvin Macias  AGE: 25 year old male  YOB: 2000  MRN: [id number]    PCP: [facility]  ED PROVIDER: Jessy Davenport PA-C    Evaluation Date & Time:   3/27/2025  3:14 PM    CHIEF COMPLAINT:  eye red, edematous      Impression and Plan   MDM: 25-year-old male with no pertinent history presents for evaluation of left eye problem.  He has been rubbing his eyes a lot recently as his eyes have been a little bit itchy while he has been fighting off a cold.  This has gotten better.  But he was vigorously rubbing his left eye today and afterwards he noticed some swelling on the lateralmost portion of his eye.  Denies any pain, vision changes, fever, trauma.  He wears glasses but not contacts.  On arrival here patient is hypertensive at 163/87, but otherwise vitally stable.  Afebrile.  On my exam patient is in no acute distress.  Reassuring visual acuity.    Not consistent with acute angle-closure glaucoma.  Woods lamp examination without evidence of corneal abrasion, corneal ulceration, conjunctival laceration.  Yousuf sign I would be concerning for globe rupture, or dendritic lesion.  He has no direct or consensual photophobia concerning for scleritis/iritis.  No conjunctival hemorrhage.  Patient has clear chemosis to the left lateral conjunctiva.  He is able to close his eye fully and does not have any evidence of bacterial conjunctivitis.    Patient's hypertension resolved after rest.  I discussed with the patient that chemosis is sometimes caused by allergies versus trauma to the area.  This would be consistent with him continuously rubbing his eye today.  Discussed that this should improve on its own.  I prescribed him some lubricating eyedrops to use as needed.  He has had some eye itching and some mild rhinorrhea over the last several weeks.  Discussed that he could be getting some seasonal allergies and that it would not hurt to try over-the-counter Zyrtec  to see if this helps as oftentimes chemosis can also be associated with an allergic process.  Patient feels very reassured and feels comfortable with discharge home.  I provided him with [facility] contact information to follow-up with them.  We reviewed strict return precautions and patient was discharged home in stable condition.         Medical Decision Making    Discharge. I prescribed additional prescription strength medication(s) as charted. N/A.    MIPS (CTPE, Dental pain, Barraza, Sinusitis, Asthma/COPD, Head Trauma): Not Applicable    SEPSIS: None          FINAL IMPRESSION:    ICD-10-CM    1. Chemosis of conjunctiva, left  H11.422             MEDICATIONS GIVEN IN THE EMERGENCY DEPARTMENT:  Medications   tetracaine (PONTOCAINE) 0.5 % ophthalmic solution 1-2 drop (2 drops Both Eyes $Given 3/27/25 1600)   fluorescein (FUL-SIERRA) ophthalmic strip 1 strip (1 strip Both Eyes $Given 3/27/25 1601)         NEW PRESCRIPTIONS STARTED AT TODAY'S ED VISIT:  Discharge Medication List as of 3/27/2025  4:15 PM        START taking these medications    Details   dextran 70-hypromellose (TEARS NATURALE FREE PF) 0.1-0.3 % ophthalmic solution Place 2 drops into both eyes daily as needed (itching & dryness)., Disp-1 each, R-0, E-Prescribe               HPI   Patient information was obtained from: patient   Use of Intrepreter: N/A     Olvin Macias is a 25 year old male with no pertinent history who presents to the ED by car for evaluation of left eye problem.  Patient notes that he has been getting over a cold recently.  Both of his eyes have been quite itchy.  He has been rubbing his eyes a lot.  He was rubbing his left eye about half an hour prior to arrival and afterwards he noticed that with his eyes swelled up.  He denies any pain.  Denies any blunt or sharp trauma to the area.  Denies any vision changes.  Has not had any eye discharge.  Wears glasses but not contacts.  Does not regularly see an eye doctor.      REVIEW OF  "SYSTEMS:  Pertinent positive and negative symptoms per HPI.       Physical Exam     First Vitals:  Patient Vitals for the past 24 hrs:   BP Temp Temp src Pulse Resp SpO2 Height Weight   03/27/25 1614 133/72 98.2  F (36.8  C) Oral -- 16 97 % -- --   03/27/25 1510 (!) 163/87 97.3  F (36.3  C) Oral 87 16 99 % 1.626 m (5' 4\") 89.4 kg (197 lb)       PHYSICAL EXAM:   General Appearance:  Alert, cooperative, no distress, appears stated age  HENT: Normocephalic without obvious deformity, atraumatic. Mucous membranes moist   Eyes: Lids normal. No discharge.  EOM intact bilaterally.  Pupils equal reactive to light bilaterally.  No nystagmus.  Right conjunctive abnormal.  Left conjunctiva with very mild injection and inflammation in the lateral aspect up to the cornea consistent with chemosis.  Left upper and lower lids were everted without any evidence of foreign body.  No direct or consensual photophobia bilaterally.  No hyphema or conjunctival hemorrhage bilaterally  Musculoskeletal: Moving all extremities. No gross deformities  Integument: Warm, dry, no rashes or lesions  Neurologic: Alert and orientated x3. G  Psych: Normal mood and affect      Results     LAB:  All pertinent labs reviewed and interpreted  Labs Ordered and Resulted from Time of ED Arrival to Time of ED Departure - No data to display    RADIOLOGY:  No orders to display         PROCEDURES:    PROCEDURE: Woods lamp Exam   INDICATIONS: Left eye irritation   PROCEDURE PROVIDER: Jessy Davenport PA-C   SITE: left eye   CONSENT:  The risks, benefits and alternatives for this procedure were explained to the patient and verbally accepted.     MEDICATION: fluorescein stain and tetracaine   EXAM FINDINGS: Left Eye: No uptake that would be consistent with corneal abrasion or ulceration.  No conjunctival laceration.  No Yousuf sign.  No dendritic lesion.   COMPLICATIONS: Patient tolerated procedure well, without complication               Jessy Davenport PA-C   Emergency " [facility] EMERGENCY DEPARTMENT       Jessy Davenport PA-C  03/27/25 1926